# Patient Record
Sex: MALE | Race: WHITE | NOT HISPANIC OR LATINO | Employment: FULL TIME | URBAN - METROPOLITAN AREA
[De-identification: names, ages, dates, MRNs, and addresses within clinical notes are randomized per-mention and may not be internally consistent; named-entity substitution may affect disease eponyms.]

---

## 2023-08-10 ENCOUNTER — OFFICE VISIT (OUTPATIENT)
Dept: FAMILY MEDICINE | Facility: CLINIC | Age: 58
End: 2023-08-10
Payer: COMMERCIAL

## 2023-08-10 VITALS
OXYGEN SATURATION: 100 % | TEMPERATURE: 97.9 F | DIASTOLIC BLOOD PRESSURE: 80 MMHG | HEART RATE: 71 BPM | WEIGHT: 220.24 LBS | SYSTOLIC BLOOD PRESSURE: 132 MMHG

## 2023-08-10 DIAGNOSIS — R03.0 ELEVATED BLOOD PRESSURE READING WITHOUT DIAGNOSIS OF HYPERTENSION: Primary | ICD-10-CM

## 2023-08-10 PROCEDURE — 99203 OFFICE O/P NEW LOW 30 MIN: CPT

## 2023-08-10 NOTE — PATIENT INSTRUCTIONS
Schedule an annual exam with a primary care provider for blood pressure recheck and to discuss preventative medicine screenings/recommendations!

## 2023-08-10 NOTE — ASSESSMENT & PLAN NOTE
Blood pressure only mildly elevated today in clinic at 132/80.  Patient has not doctored extensively in the past, therefore previous readings are not available.  Not appropriate for medication management at this time based on these numbers.  Forms filled out for DOT.  Encouraged him to schedule an annual physical with a primary care provider to discuss further.  He was provided with information regarding lifestyle modifications to help address high blood pressure and check his blood pressure at home.  She expressed understanding of and agreement with this plan.  All questions were answered.

## 2023-08-10 NOTE — PROGRESS NOTES
Assessment & Plan   Problem List Items Addressed This Visit          Other    Elevated blood pressure reading without diagnosis of hypertension - Primary     Blood pressure only mildly elevated today in clinic at 132/80.  Patient has not doctored extensively in the past, therefore previous readings are not available.  Not appropriate for medication management at this time based on these numbers.  Forms filled out for DOT.  Encouraged him to schedule an annual physical with a primary care provider to discuss further.  He was provided with information regarding lifestyle modifications to help address high blood pressure and check his blood pressure at home.  She expressed understanding of and agreement with this plan.  All questions were answered.           LINWOOD Syed CNP  M [facility] CHRSI Navarro is a 57 year old, presenting for the following health issues:  BP follow-up        8/10/2023     1:11 PM   Additional Questions   Roomed by Sylvia LANDON       Is being seen today at the request of the DOT in follow-up to his DOT physical.  At his physical, his blood pressure was elevated to 160/102 and 156/94.  They requested that he follow-up with a provider regarding this.  He does not have a routine primary care provider.  He denies any history of hypertension and has never been managed medically for hypertension.  He has no cardiac symptoms today, including chest pain, palpitations, swelling of the extremities.  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  His diet is not the best per his report to, and he states that he often eats on the go food during his shifts.  However, at home, he does not eat a lot of fresh foods including fruits and vegetables.    History of Present Illness       Reason for visit:  Blood pressure check    He eats 2-3 servings of fruits and vegetables daily.He consumes 1 sweetened beverage(s)  daily.He exercises with enough effort to increase his heart rate 20 to 29 minutes per day.  He exercises with enough effort to increase his heart rate 5 days per week.   He is taking medications regularly.     Review of Systems         Objective    /80   Pulse 71   Temp 97.9  F (36.6  C) (Temporal)   Wt 99.9 kg (220 lb 3.8 oz)   SpO2 100%   There is no height or weight on file to calculate BMI.    Physical Exam  Vitals and nursing note reviewed.   Constitutional:       Appearance: Normal appearance.   Cardiovascular:      Rate and Rhythm: Normal rate and regular rhythm.      Pulses: Normal pulses.      Heart sounds: Normal heart sounds.   Pulmonary:      Effort: Pulmonary effort is normal. No respiratory distress.   Musculoskeletal:      Right lower leg: No edema.      Left lower leg: No edema.   Neurological:      Mental Status: He is alert.

## 2023-10-05 ENCOUNTER — OFFICE VISIT (OUTPATIENT)
Dept: FAMILY MEDICINE | Facility: CLINIC | Age: 58
End: 2023-10-05
Payer: COMMERCIAL

## 2023-10-05 VITALS
HEART RATE: 67 BPM | TEMPERATURE: 98.1 F | WEIGHT: 219 LBS | RESPIRATION RATE: 16 BRPM | DIASTOLIC BLOOD PRESSURE: 82 MMHG | OXYGEN SATURATION: 98 % | SYSTOLIC BLOOD PRESSURE: 128 MMHG | HEIGHT: 70 IN | BODY MASS INDEX: 31.35 KG/M2

## 2023-10-05 DIAGNOSIS — Z01.30 BLOOD PRESSURE CHECK: Primary | ICD-10-CM

## 2023-10-05 PROCEDURE — 99213 OFFICE O/P EST LOW 20 MIN: CPT

## 2023-10-05 NOTE — LETTER
October 5, 2023      Ramon Fuentes  [address] [facility] [address]        To Whom It May Concern,     Ramon Fuentes has been seen twice in my clinic for follow-up on high blood pressure readings that he is obtained at [facility] physicals.  Both times, his blood pressure has been within normal range.  He has no history of hypertension per his report.  Below are his most recent vital signs:    10/05/2023: 128/82, HR 67  08/08/2023: 132/80, HR 71       Sincerely,        LINWOOD Syed CNP

## 2023-10-05 NOTE — ASSESSMENT & PLAN NOTE
Again, blood pressure is within goal range today.  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  It is reassuring that he has had normotensive readings both in clinic and outside of the clinic setting.  At this time, I have provided him with a letter that indicates his clinic readings to within goal.  I encouraged him to reach out to the DOT and the provider that has been performing his DOT physicals with this information.  Reiterated that I have no indication to start him on antihypertensives based on his blood pressure readings in an outside clinic.  Patient expressed understanding of and agreement with this plan.  All questions were answered.

## 2023-10-05 NOTE — PROGRESS NOTES
Assessment & Plan   Problem List Items Addressed This Visit          Other    Blood pressure check - Primary     Again, blood pressure is within goal range today.  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  It is reassuring that he has had normotensive readings both in clinic and outside of the clinic setting.  At this time, I have provided him with a letter that indicates his clinic readings to within goal.  I encouraged him to reach out to the DOT and the provider that has been performing his DOT physicals with this information.  Reiterated that I have no indication to start him on antihypertensives based on his blood pressure readings in an outside clinic.  Patient expressed understanding of and agreement with this plan.  All questions were answered.           LINWOOD Syed CNP  [facility] CHRIS Navarro is a 57 year old, presenting for the following health issues:  Hypertension (Follow UP Failed DOT PHY.)        10/5/2023     2:21 PM   Additional Questions   Roomed by sac   Accompanied by self         10/5/2023     2:21 PM   Patient Reported Additional Medications   Patient reports taking the following new medications no       Patient being seen today in follow-up for blood pressure.  He was seen in August of this year, after he failed a DOT physical for hypertension.  At that time, his blood pressure was within goal range in clinic.  He has no history of diagnosed hypertension, although has not traditionally doctored in the past.  Today, he reports that he went for a follow-up at the chiropractor who  "completes his DOT physical, and he remained elevated.  For this reason, they would only clear him for a 3 month permit.  He reports that he does check his blood pressure intermittently outside of clinic, and is always within goal range.    History of Present Illness       Hypertension: He presents for follow up of hypertension.  He does not check blood pressure  regularly outside of the clinic. Outside blood pressures have been over 140/90. He does not follow a low salt diet.     He eats 2-3 servings of fruits and vegetables daily.He consumes 1 sweetened beverage(s) daily.He exercises with enough effort to increase his heart rate 30 to 60 minutes per day.  He exercises with enough effort to increase his heart rate 5 days per week.   He is taking medications regularly.     Review of Systems         Objective    /82 (BP Location: Left arm, Patient Position: Sitting, Cuff Size: Adult Large)   Pulse 67   Temp 98.1  F (36.7  C) (Oral)   Resp 16   Ht 1.778 m (5' 10\")   Wt 99.3 kg (219 lb)   SpO2 98%   BMI 31.42 kg/m    Body mass index is 31.42 kg/m .    Physical Exam  Vitals and nursing note reviewed.   Constitutional:       Appearance: Normal appearance.   Cardiovascular:      Rate and Rhythm: Normal rate.   Pulmonary:      Effort: Pulmonary effort is normal. No respiratory distress.   Neurological:      Mental Status: He is alert.   Psychiatric:         Mood and Affect: Mood normal.         Behavior: Behavior normal.         Thought Content: Thought content normal.                  "

## 2024-03-19 ENCOUNTER — TELEPHONE (OUTPATIENT)
Dept: FAMILY MEDICINE | Facility: CLINIC | Age: 59
End: 2024-03-19

## 2024-03-19 NOTE — TELEPHONE ENCOUNTER
Patient Quality Outreach    Patient is due for the following:   Colon Cancer Screening  Physical Preventive Adult Physical    Next Steps:   No follow up needed at this time.    Type of outreach:    Sent letter.      Questions for provider review:    None           Mali Christopher MA

## 2024-11-30 ENCOUNTER — APPOINTMENT (OUTPATIENT)
Dept: RADIOLOGY | Facility: HOSPITAL | Age: 59
End: 2024-11-30
Attending: EMERGENCY MEDICINE
Payer: COMMERCIAL

## 2024-11-30 ENCOUNTER — HOSPITAL ENCOUNTER (EMERGENCY)
Facility: HOSPITAL | Age: 59
Discharge: HOME | End: 2024-11-30
Attending: EMERGENCY MEDICINE | Admitting: EMERGENCY MEDICINE
Payer: COMMERCIAL

## 2024-11-30 VITALS
DIASTOLIC BLOOD PRESSURE: 76 MMHG | TEMPERATURE: 98 F | OXYGEN SATURATION: 96 % | RESPIRATION RATE: 20 BRPM | WEIGHT: 224.87 LBS | BODY MASS INDEX: 32.19 KG/M2 | HEIGHT: 70 IN | HEART RATE: 53 BPM | SYSTOLIC BLOOD PRESSURE: 161 MMHG

## 2024-11-30 DIAGNOSIS — S20.212A CHEST WALL CONTUSION, LEFT, INITIAL ENCOUNTER: ICD-10-CM

## 2024-11-30 PROCEDURE — 71101 X-RAY EXAM UNILAT RIBS/CHEST: CPT | Mod: LT

## 2024-11-30 PROCEDURE — 99283 EMERGENCY DEPT VISIT LOW MDM: CPT

## 2024-11-30 PROCEDURE — 250N000013 HC RX MED GY IP 250 OP 250 PS 637: Performed by: EMERGENCY MEDICINE

## 2024-11-30 RX ORDER — OXYCODONE HYDROCHLORIDE 5 MG/1
5 TABLET ORAL EVERY 4 HOURS PRN
Qty: 12 TABLET | Refills: 0 | Status: SHIPPED | OUTPATIENT
Start: 2024-11-30 | End: 2024-12-04

## 2024-11-30 RX ORDER — OXYCODONE HYDROCHLORIDE 5 MG/1
5 TABLET ORAL ONCE
Status: COMPLETED | OUTPATIENT
Start: 2024-11-30 | End: 2024-11-30

## 2024-11-30 RX ADMIN — OXYCODONE HYDROCHLORIDE 5 MG: 5 TABLET ORAL at 18:42

## 2024-11-30 ASSESSMENT — ACTIVITIES OF DAILY LIVING (ADL)
ADLS_ACUITY_SCORE: 41

## 2024-11-30 NOTE — Clinical Note
Ramon Fuentes was seen and treated in our emergency department on 11/30/2024.  He may return to work on 12/04/2024.       If you have any questions or concerns, please don't hesitate to call.      Gisela Hernandez MD

## 2024-12-01 NOTE — ED TRIAGE NOTES
Pt fell this morning around 0500 coming down the stairs. Landing with his chest on the 1st and 2nd step.  Hurts when taking deep breath.       Triage Assessment (Adult)       Row Name 11/30/24 0654          Triage Assessment    Airway WDL WDL        Respiratory WDL    Respiratory WDL WDL        Skin Circulation/Temperature WDL    Skin Circulation/Temperature WDL WDL        Cardiac WDL    Cardiac WDL X;chest pain        Chest Pain Assessment    Chest Pain Location other (see comments)  left sided     Precipitating Factors other (see comments)  fall        Peripheral/Neurovascular WDL    Peripheral Neurovascular WDL WDL        Cognitive/Neuro/Behavioral WDL    Cognitive/Neuro/Behavioral WDL WDL

## 2024-12-01 NOTE — DISCHARGE INSTRUCTIONS
Chest x-ray does not show any rib fractures.  Use Tylenol, ibuprofen as needed for pain.  Oxycodone as needed for breakthrough pain.  Try to save this for sleep.

## 2024-12-01 NOTE — ED PROVIDER NOTES
EMERGENCY DEPARTMENT ENCOUNTER      NAME: Ramon Fuentes  AGE: 59 year old male  YOB: 1965  MRN: [id number]  EVALUATION DATE & TIME: 11/30/2024  6:34 PM    PCP: No Ref-Primary, Physician    ED PROVIDER: Gisela Hernandez MD    Chief Complaint   Patient presents with    Fall    Rib Pain         FINAL IMPRESSION:  1. Chest wall contusion, left, initial encounter          ED COURSE & MEDICAL DECISION MAKING:    Pertinent Labs & Imaging studies reviewed. (See chart for details)  59 year old male with history of otherwise healthy who presents to the Emergency Department for evaluation of fall down stairs at around 5 AM landing on the left side of the chest, complaining of chest wall pain.  On examination has ecchymosis on the left posterior inferior lateral chest wall.  No palpable crepitus or subcutaneous emphysema.  Suspect contusion versus rib fracture and less likely hemopneumothorax.  Patient is not having any urinary symptoms, hematuria to suspect retroperitoneal injury, renal laceration.    Given dose of oxycodone for pain.  X-rays of the chest and left rib cage unremarkable.  Home with prescription for same.      ED Course as of 11/30/24 2315   Sat Nov 30, 2024 1812 I met with the patient, obtained history, performed an initial exam, and discussed options and plan for diagnostics and treatment here in the ED.     1856 Patient rechecked and updated on imaging results   1955 Ribs XR, unilat 3 views + PA chest,  left  Chest x-ray independently interpreted by myself without visualized displaced rib fracture   2110 We discussed the plan for discharge and the patient is agreeable. Reviewed supportive cares, symptomatic treatment, outpatient follow up, and reasons to return to the Emergency Department. Patient to be discharged by ED RN.          Medical Decision Making  Obtained supplemental history:Supplemental history obtained?: Other: Patient's Wife  Reviewed external records: External records  reviewed?:  10/5/2023 clinic note  Care impacted by chronic illness:Documented in Chart  Did you consider but not order tests?: Work up considered but not performed and documented in chart, if applicable  Did you interpret images independently?: Independent interpretation of ECG and images noted in documentation, when applicable.  Consultation discussion with other provider:Did you involve another provider (consultant, , pharmacy, etc.)?: No  Discharge. I prescribed additional prescription strength medication(s) as charted. See documentation for any additional details.    MIPS: Not Applicable      At the conclusion of the encounter I discussed the results of all of the tests and the disposition. The questions were answered. The patient or family acknowledged understanding and was agreeable with the care plan.      MEDICATIONS GIVEN IN THE EMERGENCY:  Medications   oxyCODONE (ROXICODONE) tablet 5 mg (5 mg Oral $Given 11/30/24 1842)       NEW PRESCRIPTIONS STARTED AT TODAY'S ER VISIT  Discharge Medication List as of 11/30/2024  8:41 PM        START taking these medications    Details   oxyCODONE (ROXICODONE) 5 MG tablet Take 1 tablet (5 mg) by mouth every 4 hours as needed. If pain is not improved with acetaminophen and ibuprofen., Disp-12 tablet, R-0, Local Print                =================================================================    HPI    Patient information was obtained from: Patient    Use of Intrepreter: N/A     Ramon Fuentes is a 59 year old male with no pertinent medical history who presents with a fall    Patient reports falling down around 5 AM this morning (11/30/2024) while going down steps. Per nurse triage note patient landed with his chest on 1st and 2nd step. Endorsed left upper back pain as well as pain with deep breaths. He states he took 3 advil and 2 alleve for pain with no significant relief.    He denied hematuria or abdominal pain. Has history of white coat hypertension per Wife.  "He doesn't currently check blood pressures regularly. Denied any past medical history or daily medications. No other complaints at this time.    PAST MEDICAL HISTORY:  No past medical history on file.    PAST SURGICAL HISTORY:  No past surgical history on file.    CURRENT MEDICATIONS:    None       ALLERGIES:  No Known Allergies    FAMILY HISTORY:  No family history on file.    SOCIAL HISTORY:  Social History     Tobacco Use    Smoking status: Never     Passive exposure: Never    Smokeless tobacco: Never   Vaping Use    Vaping status: Never Used        VITALS:  Patient Vitals for the past 24 hrs:   BP Temp Temp src Pulse Resp SpO2 Height Weight   11/30/24 2055 -- -- -- 53 -- 96 % -- --   11/30/24 1855 (!) 161/76 -- -- 60 -- 97 % -- --   11/30/24 1826 (!) 178/94 98  F (36.7  C) Temporal 62 20 97 % 1.778 m (5' 10\") 102 kg (224 lb 13.9 oz)       PHYSICAL EXAM    General Appearance: Well-appearing, well-nourished, no acute distress   Head:  Normocephalic, atraumatic  Eyes:   conjunctiva/corneas clear  ENT:  membranes are moist without pallor  Neck:  Supple, tenderness palpation  Chest:  No tenderness or deformity, Left posterior-lateral inferior rib cage tenderness with no crepitus.  Cardio:  Regular rate and rhythm  Pulm:  No respiratory distress, clear to auscultation bilaterally  Back:  No midline tenderness to palpation, no paraspinal tenderness, No CVA tenderness, normal ROM.   Abdomen:  Soft, non-tender  Extremities: Normal gait  Skin:  Skin warm, dry. 3 x 4 cm area of ecchymosis to left posterior-lateral inferior rib cage.  Neuro:  Alert and oriented ×3, moving all extremities, no gross sensory defects     RADIOLOGY/LABS:  Reviewed all pertinent imaging. Please see official radiology report. All pertinent labs reviewed and interpreted.    Results for orders placed or performed during the hospital encounter of 11/30/24   Ribs XR, unilat 3 views + PA chest,  left    Impression    IMPRESSION: The visualized heart " and lungs are negative. No acute left rib fractures. Old fracture of the lateral aspect of the right 10th rib.         The creation of this record is based on the scribe s observations of the work being performed by Gisela Hernandez MD and the provider s statements to them. It was created on her behalf by Emily Chatman , a trained medical scribe. This document has been checked and approved by the attending provider.    Gisela Hernandez MD  Emergency Medicine  [facility] EMERGENCY DEPARTMENT  [address] [place] [id number]  [phone]  Dept: [phone]     Gisela Hernandez MD  11/30/24 6512

## 2025-01-19 ENCOUNTER — HEALTH MAINTENANCE LETTER (OUTPATIENT)
Age: 60
End: 2025-01-19

## 2025-05-07 ENCOUNTER — TELEPHONE (OUTPATIENT)
Dept: FAMILY MEDICINE | Facility: CLINIC | Age: 60
End: 2025-05-07
Payer: COMMERCIAL

## 2025-05-07 NOTE — TELEPHONE ENCOUNTER
Wood tick that is red and now has a rash.  4 inches out of it and is red, not a bullseye.  No symptoms just the rash.      Reason for Call:  Appointment Request    Patient requesting this type of appt:  would like appointment in the next couple of days    Requested provider: anyone at the [facility]    Reason patient unable to be scheduled: Not within requested timeframe    When does patient want to be seen/preferred time: 1-2 days    Scheduled tomorrow 5/8/25     Could we send this information to you in [place] or would you prefer to receive a phone call?:   Patient would prefer a phone call   Okay to leave a detailed message?: Yes at Cell number on file:    Telephone Information:   Mobile [phone]       Call taken on 5/7/2025 at 2:43 PM by Lorelei Espinoza RN

## 2025-05-08 ENCOUNTER — OFFICE VISIT (OUTPATIENT)
Dept: FAMILY MEDICINE | Facility: CLINIC | Age: 60
End: 2025-05-08
Payer: COMMERCIAL

## 2025-05-08 VITALS
HEIGHT: 70 IN | OXYGEN SATURATION: 97 % | TEMPERATURE: 98.7 F | RESPIRATION RATE: 18 BRPM | SYSTOLIC BLOOD PRESSURE: 134 MMHG | HEART RATE: 72 BPM | BODY MASS INDEX: 32.07 KG/M2 | DIASTOLIC BLOOD PRESSURE: 88 MMHG | WEIGHT: 224 LBS

## 2025-05-08 DIAGNOSIS — Z11.59 NEED FOR HEPATITIS C SCREENING TEST: ICD-10-CM

## 2025-05-08 DIAGNOSIS — A69.20 LYME DISEASE: ICD-10-CM

## 2025-05-08 DIAGNOSIS — Z12.11 SCREEN FOR COLON CANCER: ICD-10-CM

## 2025-05-08 DIAGNOSIS — Z11.4 SCREENING FOR HIV (HUMAN IMMUNODEFICIENCY VIRUS): ICD-10-CM

## 2025-05-08 DIAGNOSIS — Z13.6 SCREENING FOR CARDIOVASCULAR CONDITION: ICD-10-CM

## 2025-05-08 DIAGNOSIS — Z13.1 SCREENING FOR DIABETES MELLITUS: Primary | ICD-10-CM

## 2025-05-08 LAB
B BURGDOR IGG+IGM SER QL: 0.07
ERYTHROCYTE [DISTWIDTH] IN BLOOD BY AUTOMATED COUNT: 12.6 % (ref 10–15)
ERYTHROCYTE [SEDIMENTATION RATE] IN BLOOD BY WESTERGREN METHOD: 15 MM/HR (ref 0–20)
HCT VFR BLD AUTO: 40.4 % (ref 40–53)
HGB BLD-MCNC: 13.5 G/DL (ref 13.3–17.7)
MCH RBC QN AUTO: 28.6 PG (ref 26.5–33)
MCHC RBC AUTO-ENTMCNC: 33.4 G/DL (ref 31.5–36.5)
MCV RBC AUTO: 86 FL (ref 78–100)
PLATELET # BLD AUTO: 202 10E3/UL (ref 150–450)
RBC # BLD AUTO: 4.72 10E6/UL (ref 4.4–5.9)
WBC # BLD AUTO: 4.4 10E3/UL (ref 4–11)

## 2025-05-08 RX ORDER — DOXYCYCLINE HYCLATE 100 MG
100 TABLET ORAL 2 TIMES DAILY
Qty: 28 TABLET | Refills: 0 | Status: SHIPPED | OUTPATIENT
Start: 2025-05-08 | End: 2025-05-22

## 2025-05-08 NOTE — PROGRESS NOTES
"  {PROVIDER CHARTING PREFERENCE:596579}    Juliette Navarro is a 59 year old, presenting for the following health issues:  Tick Bite      5/8/2025     2:47 PM   Additional Questions   Roomed by as   Accompanied by self         5/8/2025     2:47 PM   Patient Reported Additional Medications   Patient reports taking the following new medications no     History of Present Illness       Reason for visit:  Woodtick bite  Symptom onset:  1-2 weeks ago  Symptoms include:  Rash hives  Symptom intensity:  Moderate  Symptom progression:  Staying the same  Had these symptoms before:  No  What makes it worse:  No  What makes it better:  No   He is taking medications regularly.        {MA/LPN/RN Pre-Provider Visit Orders- hCG/UA/Strep (Optional):982858}  {SUPERLIST (Optional):412088}  {additonal problems for provider to add (Optional):097224}    {ROS Picklists (Optional):055143}      Objective    /88 (BP Location: Left arm, Patient Position: Left side, Cuff Size: Adult Large)   Pulse 72   Temp 98.7  F (37.1  C) (Oral)   Resp 18   Ht 1.778 m (5' 10\")   Wt 101.6 kg (224 lb)   SpO2 97%   BMI 32.14 kg/m    Body mass index is 32.14 kg/m .  Physical Exam   {Exam List (Optional):938207}    {Diagnostic Test Results (Optional):114843}        Signed Electronically by: PRABHJOT SANCHEZ MD  {Email feedback regarding this note to [email or web address]   :[id number]}  "

## 2025-05-09 ENCOUNTER — RESULTS FOLLOW-UP (OUTPATIENT)
Dept: FAMILY MEDICINE | Facility: CLINIC | Age: 60
End: 2025-05-09

## 2025-05-09 LAB — CRP SERPL-MCNC: <3 MG/L
